# Patient Record
(demographics unavailable — no encounter records)

---

## 2024-11-27 NOTE — HISTORY OF PRESENT ILLNESS
[de-identified] : copper IUD insertion  [FreeTextEntry6] : YVONNE is a 17 year old female presenting for copper IUD insertion.  We have reviewed the contraindications to the copper IUD.  She does not have any of the following: a distorted uterine cavity or known uterine anomaly, cervical or endometrial cancer, gestational trophoblastic disease, AIDS, PID or current purulent cervicitis, chlamydia, or gonorrhea, a recent septic , a known or suspected pregnancy, lupus with severe thrombocytopenia, pelvic tuberculosis, or unexplained vaginal bleeding.  She does not have an allergy to copper.   She is not taking any medications that would interfere with the effectiveness of this method.   A consent form has been signed by the patient and will be added to her chart.  Possible side effects of the copper IUD have been discussed with the patient, including: changes in menstrual bleeding patterns, including heavier and longer periods, pelvic pain and cramps.  There is also a small risk of IUD expulsion.  Benefits and risks of IUD insertion have been explained.  Possible complications from IUD insertion may include pain, bleeding, infection, and uterine perforation.   LMP: 24 Date of last sexual activity: 24  Condom: yes; using condoms most of the time  Hormonal birth control: no Date of last STI screening: never   POCT urine HCG result: negative Current medications: none Allergies: NKDA

## 2024-11-27 NOTE — DISCUSSION/SUMMARY
[FreeTextEntry1] : 17 year old female here for copper IUD insertion (Paragard).  She has no contraindications to the IUD.  Urine HCG negative today.  YVONNE has a signed consent on file.  Risks and benefits of the IUD have been explained to the patient's satisfaction.  All questions were answered.  The patient was premedicated with 400 mg of ibuprofen prior to the procedure and offered a warm compress.   Procedure Note   Time out taken: 9:39 am Team: Dr. Correa, Dr. Floyd, Dr. Amaya Patient first name, last name, and date of birth were confirmed by patient and team. YES Correct procedure, IUD insertion, was confirmed by patient and team. YES Equipment for procedure available: YES   Aseptic technique was used throughout the procedure. The patient was placed in the lithotomy position and a bimanual exam was performed.  The uterus was anteverted.  A metal speculum was placed into the vagina and the cervix was visualized.  An endocervical specimen was obtained for STI screening for GC and chlamydia.  The cervix and vagina were cleansed with an antiseptic solution.  Approximately 2 cc of 1% lidocaine were injected into the 12 o'clock position of the cervix. The anterior lip of the cervix was grasped with tenaculum forceps and gentle traction was applied to stabilize and align the cervical canal with the uterine cavity.  The tenaculum remained in position and gentle traction on the cervix was maintained throughout the insertion procedure. A plastic uterine sound was advanced through the external and internal os until it reached the fundus of the uterus, a depth of 8 cm. The use of os finders or dilators was not needed. The sound was then withdrawn. The IUD was loaded in a sterile manner and advanced into position. The strings were visualized and cut to 3 cm.  Hemostasis was noted.  Patient tolerated the procedure well. No complications were noted.   After-care instructions were thoroughly reviewed with the patient and all questions were answered.  The patient was instructed to return to clinic in 4-6 weeks for a repeat urine pregnancy test and contraceptive surveillance, or sooner as needed for any questions or concerns.

## 2024-11-27 NOTE — BEGINNING OF VISIT
[Patient] : patient [] :  [Other: ______] : provided by [employer] [Time Spent: ____ minutes] : Total time spent using  services: [unfilled] minutes. The patient's primary language is not English thus required  services. [Interpreters_IDNumber] : [id number] [Interpreters_FullName] : Ismael [TWNoteComboBox1] : [place]

## 2024-11-27 NOTE — PHYSICAL EXAM
[NL] : no acute distress, alert [Normal external genitalia] : normal external genitalia [FreeTextEntry6] : normal internal exam; brown blood noted at cervical os

## 2024-12-13 NOTE — HISTORY OF PRESENT ILLNESS
[de-identified] : concern regarding IUD  [FreeTextEntry6] : 17-year-old female presenting with concern regarding IUD. Pt is concerned that she can not feel the IUD strings.   Pt is otherwise happy with the method. No side effects. Pt has not yet had a menstrual period on IUD yet.   Last sexual activity 1 week ago, condom broke. No new sexual partner since last testing.

## 2024-12-13 NOTE — PHYSICAL EXAM
[NL] : no acute distress, alert [Mamadou: ____] : Mamadou [unfilled] [Normal external genitalia] : normal external genitalia [FreeTextEntry6] : IUD strings visualized at cervical os; + transparent, non-adherent discharge; mild irritation of cervical os, no friability; no CMT

## 2024-12-13 NOTE — BEGINNING OF VISIT
[Patient] : patient [] :  [[employer] ] : provided by [employer]   [Time Spent: ____ minutes] : Total time spent using  services: [unfilled] minutes. The patient's primary language is not English thus required  services. [Interpreters_IDNumber] : [id number] [TWNoteComboBox1] : Slovenian

## 2024-12-13 NOTE — DISCUSSION/SUMMARY
[FreeTextEntry1] : 17 year old female presenting for surveillance of IUD.   -Negative urine pregnancy test.  -GYN exam done. IUD strings visualized. Provided reassurance. Advised that the IUD strings have likely softened from discharge.  -Advised pt to return if she experiences any pain and/or abnormal vaginal, itching, or discharge.  -Will plan to repeat GYN in January 2025 to reassess mild irritation of cervical os.  -If there are any concerns will refer for ultrasound to confirm placement and repeat STI testing if indicated.  -Return to [facility] 12/16/24 for influenza vaccine. VIS & consent given.

## 2024-12-16 NOTE — PLAN
[FreeTextEntry1] : 17-year-old female presenting for influenza vaccine. Vaccine given without incident. Pt tolerated vaccine well. Vaccines UTD.

## 2024-12-16 NOTE — HISTORY OF PRESENT ILLNESS
[Influenza] : Influenza [FreeTextEntry1] : 17-year-old female presenting for influenza vaccine.   Pt denies history of adverse reaction to vaccines in the past. Pt denies history of asthma, seizures, anaphylaxis, thrombocytopenia, Guillain [place], blood transfusion, or latex allergy. Pt denies recent illness. Pt feels well. No complaints.

## 2025-04-28 NOTE — HISTORY OF PRESENT ILLNESS
[de-identified] : prolonged bleeding on IUD  [FreeTextEntry6] : 17-year-old female with concern regarding menses since insertion of copper IUD.   Pt reports period has been about 3 weeks each month since insertion of the copper IUD. Pt complains of heavy flow. Pt reports menstrual cramps are worse than prior to insertion of IUD.   Last sexual activity: December 2024.   Pt denies abnormal vaginal odor, vaginal discharge, vaginal itching, dysuria, or abdominal pain.

## 2025-04-28 NOTE — DISCUSSION/SUMMARY
[FreeTextEntry1] : 17-year-old female presenting with prolonged, heavy periods on copper IUD. Pt had copper IUD insertion in November 2024.   -Urine pregnancy test negative.  -Ordered urine GC/CT and trichomoniasis to rule out infection.  -Ordered CBC & ferritin to assess for anemia.  -Advised that periods are often longer, heavier, and crampier with the copper IUD. Pt wants to change to the hormonal IUD. Counseled on potential side effects with hormonal IUD.  -Contacted Yesika Zuñiga MD regarding removal and insertion of hormonal IUD. Once visit is scheduled will reach out to pt.

## 2025-04-28 NOTE — REVIEW OF SYSTEMS
[Irregular Vaginal Bleeding] : irregular vaginal bleeding [Negative] : Constitutional [Abdominal Pain] : no abdominal pain [Dysuria] : no dysuria [Vaginal Dischage] : no vaginal discharge [Vaginal Itch] : no vaginal itch

## 2025-04-30 NOTE — BEGINNING OF VISIT
[Patient] : patient [] :  [[employer] ] : provided by [employer]   [Time Spent: ____ minutes] : Total time spent using  services: [unfilled] minutes. The patient's primary language is not English thus required  services. [Interpreters_IDNumber] : [id number] [TWNoteComboBox1] : Paraguayan

## 2025-04-30 NOTE — HISTORY OF PRESENT ILLNESS
[de-identified] : Copper IUD removal and Mirena IUD insertion  [FreeTextEntry6] : 17 year old female here today for removal of her copper IUD.  She would like it removed because of: heavier, more painful, and prolonged menses since IUD insertion.  The IUD was placed at our facility. She has had the IUD in place for 5 months. The IUD type is: PARAGARD. She denies fevers, dysuria, vaginal discharge, or dyspareunia.  She was informed of the potential risks of the removal procedure, including pain and/or bleeding.    She would like to have another IUD inserted: YES - Mirena IUD.  LMP: ~2 weeks ago Date of last sexual activity: 2024   Condom: yes - using condoms sometimes   Date of last STI screenin25 - negative  New partner since last screening? no  Terrie would like the Mirena IUD placed.  We have reviewed the contraindications to the levonorgestrel IUD.  She does not have any of the following: a distorted uterine cavity or known uterine anomaly, current or past history of breast cancer, cervical or endometrial cancer, severe cirrhosis or a liver tumor, gestational trophoblastic disease, PID or current purulent cervicitis, chlamydia, or gonorrhea, a recent septic , a known or suspected pregnancy, pelvic tuberculosis, or unexplained vaginal bleeding.   She is not taking any medications that would interfere with the effectiveness of this method.   A consent form has been signed by the patient and will be added to her chart.  Possible side effects of the levonorgestrel IUD have been discussed with the patient, including: changes in menstrual bleeding patterns, spotting and irregular bleeding particularly in the first 3 to 6 months after insertion, amenorrhea, pelvic pain and cramps.  There is also a small risk of IUD expulsion.  Benefits and risks of IUD insertion have been explained.  Possible complications from IUD insertion may include pain, bleeding, infection, and uterine perforation.   POCT urine HCG result: negative Current medications: Paragard IUD only Allergies: NKDA

## 2025-04-30 NOTE — DISCUSSION/SUMMARY
[FreeTextEntry1] : 17 year old female here today for removal of her copper IUD.  She would like it removed because of: heavier, more painful, and prolonged menses since IUD insertion 5 months ago.  Her pregnancy test and STI screening are negative.   Procedure Note (IUD removal):  Time out performed at: 10:03 am Staff present during time out: Dr. Yesika Correa, Dr. Rosy Floyd, ENID Bernard  Patient's full name and date of birth confirmed: YES Correct procedure confirmed: YES Patient's allergies confirmed: YES Procedural consent signed by patient and performing provider: YES Correct supplies and equipment present: YES   The patient was placed in the lithotomy position and a bimanual exam was performed.  There was no cervical motion tenderness or adnexal tenderness.  A speculum was placed. The cervix was normal in appearance.  The IUD strings were seen at the external os, grasped with sterile ring forceps, and the IUD was removed without difficulty in its entirety. An IUD hook or other device was not needed.  Patient tolerated the procedure well. There was not a complication.    The patient is interested in Mirena IUD insertion for contraception.  She has no contraindications to the IUD.  Urine HCG negative today.  YVONNE has a signed consent on file.  Risks and benefits of the IUD have been explained to the patient's satisfaction.  All questions were answered.  The patient was premedicated with 400 mg of ibuprofen prior to the procedure and offered a warm compress.   Procedure Note (IUD insertion):  Aseptic technique was used throughout the procedure. The patient was placed in the lithotomy position and a bimanual exam was performed.  A metal speculum was placed into the vagina and the cervix was visualized.  The cervix and vagina were cleansed with an antiseptic solution.  Approximately 1 cc of 1% lidocaine were injected into the 12 o'clock position of the cervix. The upper lip of the cervix was grasped with tenaculum forceps and gentle traction was applied to stabilize and align the cervical canal with the uterine cavity.  The tenaculum remained in position and gentle traction on the cervix was maintained throughout the insertion procedure. A plastic uterine sound was advanced through the external and internal os until it reached the fundus of the uterus, a depth of 8 cm. The use of os finders or dilators was not needed. The sound was then withdrawn.  The IUD was loaded in a sterile manner and advanced into position. The strings were visualized and cut to ~3 cm.  Hemostasis was noted with pressure applied to the tenaculum site using sterile gauze.  Patient tolerated the procedure well. No complications were noted.    After-care instructions were thoroughly reviewed with the patient and all questions were answered.  The patient was instructed to return to clinic in 4-6 weeks for a repeat urine pregnancy test and contraceptive surveillance, or sooner as needed for any questions or concerns.  The insertion procedure was performed by Dr. Yesika Correa under direct supervision by Dr. Rosy Floyd.

## 2025-04-30 NOTE — PHYSICAL EXAM
Added to problem list [NL] : no acute distress, alert [Normal external genitalia] : normal external genitalia [FreeTextEntry6] : no cervical/uterine or adnexal tenderness to palpation on bimanual exam; cervix normal in appearance with IUD strings visualized exiting the cervical os